# Patient Record
Sex: FEMALE | Race: WHITE | NOT HISPANIC OR LATINO
[De-identification: names, ages, dates, MRNs, and addresses within clinical notes are randomized per-mention and may not be internally consistent; named-entity substitution may affect disease eponyms.]

---

## 2017-08-18 PROBLEM — Z00.00 ENCOUNTER FOR PREVENTIVE HEALTH EXAMINATION: Status: ACTIVE | Noted: 2017-08-18

## 2020-10-13 ENCOUNTER — RESULT REVIEW (OUTPATIENT)
Age: 40
End: 2020-10-13

## 2022-03-08 ENCOUNTER — RESULT REVIEW (OUTPATIENT)
Age: 42
End: 2022-03-08

## 2022-11-28 ENCOUNTER — RESULT REVIEW (OUTPATIENT)
Age: 42
End: 2022-11-28

## 2023-12-22 ENCOUNTER — APPOINTMENT (OUTPATIENT)
Dept: INTERVENTIONAL RADIOLOGY/VASCULAR | Facility: HOSPITAL | Age: 43
End: 2023-12-22

## 2024-01-12 ENCOUNTER — APPOINTMENT (OUTPATIENT)
Dept: MRI IMAGING | Facility: HOSPITAL | Age: 44
End: 2024-01-12

## 2024-01-12 ENCOUNTER — RESULT REVIEW (OUTPATIENT)
Age: 44
End: 2024-01-12

## 2024-01-12 ENCOUNTER — OUTPATIENT (OUTPATIENT)
Dept: OUTPATIENT SERVICES | Facility: HOSPITAL | Age: 44
LOS: 1 days | End: 2024-01-12
Payer: COMMERCIAL

## 2024-01-12 PROCEDURE — 72197 MRI PELVIS W/O & W/DYE: CPT

## 2024-01-12 PROCEDURE — 72197 MRI PELVIS W/O & W/DYE: CPT | Mod: 26

## 2024-01-12 PROCEDURE — A9585: CPT

## 2024-01-25 ENCOUNTER — TRANSCRIPTION ENCOUNTER (OUTPATIENT)
Age: 44
End: 2024-01-25

## 2024-02-13 ENCOUNTER — RESULT REVIEW (OUTPATIENT)
Age: 44
End: 2024-02-13

## 2024-02-13 ENCOUNTER — OUTPATIENT (OUTPATIENT)
Dept: OUTPATIENT SERVICES | Facility: HOSPITAL | Age: 44
LOS: 1 days | End: 2024-02-13
Payer: COMMERCIAL

## 2024-02-13 ENCOUNTER — APPOINTMENT (OUTPATIENT)
Dept: INTERVENTIONAL RADIOLOGY/VASCULAR | Facility: HOSPITAL | Age: 44
End: 2024-02-13
Payer: COMMERCIAL

## 2024-02-13 VITALS
RESPIRATION RATE: 18 BRPM | TEMPERATURE: 98 F | SYSTOLIC BLOOD PRESSURE: 119 MMHG | HEIGHT: 67 IN | OXYGEN SATURATION: 100 % | HEART RATE: 84 BPM | DIASTOLIC BLOOD PRESSURE: 76 MMHG | WEIGHT: 136.91 LBS

## 2024-02-13 PROCEDURE — C1894: CPT

## 2024-02-13 PROCEDURE — 36247 INS CATH ABD/L-EXT ART 3RD: CPT | Mod: 59

## 2024-02-13 PROCEDURE — 36247 INS CATH ABD/L-EXT ART 3RD: CPT

## 2024-02-13 PROCEDURE — C1769: CPT

## 2024-02-13 PROCEDURE — C1889: CPT

## 2024-02-13 PROCEDURE — 37243 VASC EMBOLIZE/OCCLUDE ORGAN: CPT

## 2024-02-13 PROCEDURE — C1887: CPT

## 2024-02-13 RX ORDER — ONDANSETRON 8 MG/1
1 TABLET, FILM COATED ORAL
Qty: 9 | Refills: 0
Start: 2024-02-13 | End: 2024-02-15

## 2024-02-13 RX ORDER — OXYCODONE HYDROCHLORIDE 5 MG/1
1 TABLET ORAL
Qty: 20 | Refills: 0
Start: 2024-02-13 | End: 2024-02-17

## 2024-02-13 RX ORDER — IBUPROFEN 200 MG
1 TABLET ORAL
Qty: 40 | Refills: 0
Start: 2024-02-13 | End: 2024-02-22

## 2024-03-05 ENCOUNTER — APPOINTMENT (OUTPATIENT)
Dept: INTERVENTIONAL RADIOLOGY/VASCULAR | Facility: HOSPITAL | Age: 44
End: 2024-03-05

## 2024-06-04 DIAGNOSIS — D25.0 SUBMUCOUS LEIOMYOMA OF UTERUS: ICD-10-CM

## 2024-07-17 ENCOUNTER — APPOINTMENT (OUTPATIENT)
Dept: INTERVENTIONAL RADIOLOGY/VASCULAR | Facility: HOSPITAL | Age: 44
End: 2024-07-17

## 2024-10-04 ENCOUNTER — APPOINTMENT (OUTPATIENT)
Dept: MRI IMAGING | Facility: HOSPITAL | Age: 44
End: 2024-10-04

## 2024-10-04 ENCOUNTER — OUTPATIENT (OUTPATIENT)
Dept: OUTPATIENT SERVICES | Facility: HOSPITAL | Age: 44
LOS: 1 days | End: 2024-10-04
Payer: COMMERCIAL

## 2024-10-04 PROCEDURE — A9585: CPT

## 2024-10-04 PROCEDURE — 72197 MRI PELVIS W/O & W/DYE: CPT

## 2024-10-04 PROCEDURE — 72197 MRI PELVIS W/O & W/DYE: CPT | Mod: 26

## 2024-10-18 ENCOUNTER — APPOINTMENT (OUTPATIENT)
Dept: INTERVENTIONAL RADIOLOGY/VASCULAR | Facility: HOSPITAL | Age: 44
End: 2024-10-18

## 2025-06-13 ENCOUNTER — OUTPATIENT (OUTPATIENT)
Dept: OUTPATIENT SERVICES | Facility: HOSPITAL | Age: 45
LOS: 1 days | End: 2025-06-13

## 2025-06-13 DIAGNOSIS — Z01.818 ENCOUNTER FOR OTHER PREPROCEDURAL EXAMINATION: ICD-10-CM

## 2025-06-13 PROCEDURE — 86901 BLOOD TYPING SEROLOGIC RH(D): CPT

## 2025-06-13 PROCEDURE — 86850 RBC ANTIBODY SCREEN: CPT

## 2025-06-13 PROCEDURE — 86900 BLOOD TYPING SEROLOGIC ABO: CPT

## 2025-07-17 VITALS
TEMPERATURE: 99 F | RESPIRATION RATE: 17 BRPM | HEART RATE: 77 BPM | DIASTOLIC BLOOD PRESSURE: 83 MMHG | SYSTOLIC BLOOD PRESSURE: 121 MMHG | OXYGEN SATURATION: 99 % | HEIGHT: 67 IN | WEIGHT: 141.1 LBS

## 2025-07-17 RX ORDER — TRANEXAMIC ACID 1000 MG/10
2 AMPUL (ML) INTRAVENOUS
Refills: 0 | DISCHARGE

## 2025-07-17 NOTE — ASU PATIENT PROFILE, ADULT - NSICDXPASTMEDICALHX_GEN_ALL_CORE_FT
PAST MEDICAL HISTORY:  Graves' disease     AURELIO (iron deficiency anemia)     Uterine fibroid

## 2025-07-17 NOTE — ASU PATIENT PROFILE, ADULT - NSICDXPASTSURGICALHX_GEN_ALL_CORE_FT
PAST SURGICAL HISTORY:  Elective surgery uterine fibroid embolization    H/O myomectomy     H/O:

## 2025-07-18 ENCOUNTER — TRANSCRIPTION ENCOUNTER (OUTPATIENT)
Age: 45
End: 2025-07-18

## 2025-07-18 ENCOUNTER — OUTPATIENT (OUTPATIENT)
Dept: OUTPATIENT SERVICES | Facility: HOSPITAL | Age: 45
LOS: 1 days | End: 2025-07-18
Payer: COMMERCIAL

## 2025-07-18 VITALS
HEART RATE: 78 BPM | OXYGEN SATURATION: 98 % | DIASTOLIC BLOOD PRESSURE: 78 MMHG | SYSTOLIC BLOOD PRESSURE: 104 MMHG | TEMPERATURE: 97 F

## 2025-07-18 DIAGNOSIS — Z98.891 HISTORY OF UTERINE SCAR FROM PREVIOUS SURGERY: Chronic | ICD-10-CM

## 2025-07-18 DIAGNOSIS — Z41.9 ENCOUNTER FOR PROCEDURE FOR PURPOSES OTHER THAN REMEDYING HEALTH STATE, UNSPECIFIED: Chronic | ICD-10-CM

## 2025-07-18 DIAGNOSIS — Z98.890 OTHER SPECIFIED POSTPROCEDURAL STATES: Chronic | ICD-10-CM

## 2025-07-18 PROCEDURE — 58544 LSH W/T/O UTERUS ABOVE 250 G: CPT

## 2025-07-18 PROCEDURE — 86900 BLOOD TYPING SEROLOGIC ABO: CPT

## 2025-07-18 PROCEDURE — 86850 RBC ANTIBODY SCREEN: CPT

## 2025-07-18 PROCEDURE — 88307 TISSUE EXAM BY PATHOLOGIST: CPT

## 2025-07-18 PROCEDURE — 88307 TISSUE EXAM BY PATHOLOGIST: CPT | Mod: 26

## 2025-07-18 PROCEDURE — 86901 BLOOD TYPING SEROLOGIC RH(D): CPT

## 2025-07-18 PROCEDURE — C1889: CPT

## 2025-07-18 PROCEDURE — 49329 UNLSTD LAPS PX ABD PERTM&OMN: CPT

## 2025-07-18 PROCEDURE — 88304 TISSUE EXAM BY PATHOLOGIST: CPT

## 2025-07-18 PROCEDURE — 82962 GLUCOSE BLOOD TEST: CPT

## 2025-07-18 PROCEDURE — 88304 TISSUE EXAM BY PATHOLOGIST: CPT | Mod: 26

## 2025-07-18 DEVICE — SURGIFLO MATRIX WITH THROMBIN KIT
Type: IMPLANTABLE DEVICE | Status: NON-FUNCTIONAL
Removed: 2025-07-18

## 2025-07-18 DEVICE — SURGIFLO HEMOSTATIC MATRIX KIT
Type: IMPLANTABLE DEVICE | Status: NON-FUNCTIONAL
Removed: 2025-07-18

## 2025-07-18 RX ORDER — METOCLOPRAMIDE HCL 10 MG
10 TABLET ORAL EVERY 6 HOURS
Refills: 0 | Status: DISCONTINUED | OUTPATIENT
Start: 2025-07-18 | End: 2025-07-18

## 2025-07-18 RX ORDER — KETOROLAC TROMETHAMINE 30 MG/ML
30 INJECTION, SOLUTION INTRAMUSCULAR; INTRAVENOUS EVERY 6 HOURS
Refills: 0 | Status: DISCONTINUED | OUTPATIENT
Start: 2025-07-18 | End: 2025-07-18

## 2025-07-18 RX ORDER — GABAPENTIN 400 MG/1
300 CAPSULE ORAL ONCE
Refills: 0 | Status: DISCONTINUED | OUTPATIENT
Start: 2025-07-18 | End: 2025-07-18

## 2025-07-18 RX ORDER — SODIUM CHLORIDE 9 G/1000ML
1000 INJECTION, SOLUTION INTRAVENOUS
Refills: 0 | Status: DISCONTINUED | OUTPATIENT
Start: 2025-07-18 | End: 2025-07-18

## 2025-07-18 RX ORDER — ONDANSETRON HCL/PF 4 MG/2 ML
8 VIAL (ML) INJECTION EVERY 6 HOURS
Refills: 0 | Status: DISCONTINUED | OUTPATIENT
Start: 2025-07-18 | End: 2025-07-18

## 2025-07-18 RX ORDER — APREPITANT 40 MG/1
40 CAPSULE ORAL ONCE
Refills: 0 | Status: COMPLETED | OUTPATIENT
Start: 2025-07-18 | End: 2025-07-18

## 2025-07-18 RX ORDER — PHENAZOPYRIDINE HCL 100 MG
200 TABLET ORAL ONCE
Refills: 0 | Status: DISCONTINUED | OUTPATIENT
Start: 2025-07-18 | End: 2025-07-18

## 2025-07-18 RX ORDER — OXYCODONE HYDROCHLORIDE 30 MG/1
5 TABLET ORAL EVERY 4 HOURS
Refills: 0 | Status: DISCONTINUED | OUTPATIENT
Start: 2025-07-18 | End: 2025-07-18

## 2025-07-18 RX ORDER — TRAMADOL HYDROCHLORIDE 50 MG/1
50 TABLET, FILM COATED ORAL ONCE
Refills: 0 | Status: DISCONTINUED | OUTPATIENT
Start: 2025-07-18 | End: 2025-07-18

## 2025-07-18 RX ORDER — BUPIVACAINE 13.3 MG/ML
20 INJECTION, SUSPENSION, LIPOSOMAL INFILTRATION ONCE
Refills: 0 | Status: DISCONTINUED | OUTPATIENT
Start: 2025-07-18 | End: 2025-07-18

## 2025-07-18 RX ORDER — ACETAMINOPHEN 500 MG/5ML
1000 LIQUID (ML) ORAL EVERY 6 HOURS
Refills: 0 | Status: DISCONTINUED | OUTPATIENT
Start: 2025-07-18 | End: 2025-07-18

## 2025-07-18 RX ORDER — OXYCODONE HYDROCHLORIDE 30 MG/1
10 TABLET ORAL EVERY 4 HOURS
Refills: 0 | Status: DISCONTINUED | OUTPATIENT
Start: 2025-07-18 | End: 2025-07-18

## 2025-07-18 RX ORDER — SCOPOLAMINE 1 MG/3D
1 PATCH, EXTENDED RELEASE TRANSDERMAL
Refills: 0 | Status: DISCONTINUED | OUTPATIENT
Start: 2025-07-18 | End: 2025-07-18

## 2025-07-18 RX ORDER — HYDROMORPHONE/SOD CHLOR,ISO/PF 2 MG/10 ML
0.5 SYRINGE (ML) INJECTION
Refills: 0 | Status: DISCONTINUED | OUTPATIENT
Start: 2025-07-18 | End: 2025-07-18

## 2025-07-18 RX ORDER — ACETAMINOPHEN 500 MG/5ML
2 LIQUID (ML) ORAL
Qty: 0 | Refills: 0 | DISCHARGE
Start: 2025-07-18

## 2025-07-18 RX ORDER — PHENAZOPYRIDINE HCL 100 MG
200 TABLET ORAL ONCE
Refills: 0 | Status: COMPLETED | OUTPATIENT
Start: 2025-07-18 | End: 2025-07-18

## 2025-07-18 RX ORDER — SIMETHICONE 80 MG
80 TABLET,CHEWABLE ORAL EVERY 6 HOURS
Refills: 0 | Status: DISCONTINUED | OUTPATIENT
Start: 2025-07-18 | End: 2025-07-18

## 2025-07-18 RX ADMIN — Medication 200 MILLIGRAM(S): at 11:36

## 2025-07-18 RX ADMIN — APREPITANT 40 MILLIGRAM(S): 40 CAPSULE ORAL at 11:36

## 2025-07-18 RX ADMIN — Medication 8 MILLIGRAM(S): at 18:54

## 2025-07-18 RX ADMIN — SCOPOLAMINE 1 PATCH: 1 PATCH, EXTENDED RELEASE TRANSDERMAL at 12:36

## 2025-07-18 NOTE — BRIEF OPERATIVE NOTE - OPERATION/FINDINGS
EBL 10 IVF 1700   Cefotetan and flagyl pre op  Abdomen entered in open fashion at umbilicus, mini lap  4 additional 5mm ports placed, 2x L lateral 2x R lateral  dense adhesions between bladder/uterus taken down with harmonic  l/s JADE, BS performed  specimen removed via morcellation via umbilicus  normal ovaries visualized  cystoscopy, b/l ureteral jets visualized  fascia of umbilical incision closed with vicryl, skin of all incisions closed w/ monocryl   EBL 10 IVF 1700   Cefotetan and flagyl pre op  Abdomen entered in open fashion above umbilicus, mini lap  4 additional 5mm ports placed, 2x L lateral 2x R lateral  dense adhesions between bladder/uterus taken down with harmonic  l/s JADE, BS performed  specimen removed via morcellation via umbilicus  normal ovaries visualized  cystoscopy, b/l ureteral jets visualized  fascia of umbilical incision closed with vicryl, skin of all incisions closed w/ monocryl

## 2025-07-18 NOTE — BRIEF OPERATIVE NOTE - NSICDXBRIEFPROCEDURE_GEN_ALL_CORE_FT
PROCEDURES:  Supracervical hysterectomy 18-Jul-2025 12:41:56  Tayo Gaytan  
PROCEDURES:  Supracervical hysterectomy 18-Jul-2025 12:41:56  Tayo Gaytan

## 2025-07-18 NOTE — ASU DISCHARGE PLAN (ADULT/PEDIATRIC) - CARE PROVIDER_API CALL
Chastity Guidry)  Obstetrics & Gynecology  [address], [address]  [place] [address]  Phone: [phone]  Fax: [phone]  Follow Up Time:

## 2025-07-18 NOTE — ASU DISCHARGE PLAN (ADULT/PEDIATRIC) - NS MD DC FALL RISK RISK
For information on Fall & Injury Prevention, visit: [email or web address] OR  [email or web address] OR  https://www.cdc.gov/steadi/patient.html

## 2025-07-18 NOTE — DISCHARGE NOTE NURSING/CASE MANAGEMENT/SOCIAL WORK - FINANCIAL ASSISTANCE
[facility] provides services at a reduced cost to those who are determined to be eligible through [facility]’s financial assistance program. Information regarding [facility]’s financial assistance program can be found by going to [email or web address] or by calling [phone].

## 2025-07-18 NOTE — DISCHARGE NOTE NURSING/CASE MANAGEMENT/SOCIAL WORK - NSDCPEFALRISK_GEN_ALL_CORE
For information on Fall & Injury Prevention, visit: [email or web address] OR  [email or web address] OR  https://www.cdc.gov/steadi/patient.html

## 2025-07-18 NOTE — BRIEF OPERATIVE NOTE - NSICDXBRIEFPREOP_GEN_ALL_CORE_FT
PRE-OP DIAGNOSIS:  Fibroid uterus 18-Jul-2025 12:42:03  Tayo Gaytan  
PRE-OP DIAGNOSIS:  Fibroid uterus 18-Jul-2025 12:42:03  Tayo Gaytan

## 2025-07-18 NOTE — PROGRESS NOTE ADULT - SUBJECTIVE AND OBJECTIVE BOX
GYN POC    Pt seen and examined at bedside. Pain well controlled, no N/V. Ambulated and feels well.     T(F): 97 (07-18-25 @ 20:50), Max: 97 (07-18-25 @ 18:18)  HR: 78 (07-18-25 @ 20:50) (77 - 93)  BP: 104/78 (07-18-25 @ 20:50) (104/78 - 132/74)  RR: 16 (07-18-25 @ 19:33) (10 - 19)  SpO2: 98% (07-18-25 @ 20:50) (98% - 100%)  Wt(kg): --    07-18 @ 07:01  -  07-19 @ 02:01  --------------------------------------------------------  IN: 485 mL / OUT: 50 mL / NET: 435 mL        acetaminophen     Tablet .. 1000 milliGRAM(s) Oral every 6 hours  gabapentin 300 milliGRAM(s) Oral once PRN moderate pain  HYDROmorphone  Injectable 0.5 milliGRAM(s) IV Push every 15 minutes PRN Severe Pain (7 - 10)  ketorolac   Injectable 30 milliGRAM(s) IV Push every 6 hours  lactated ringers. 1000 milliLiter(s) IV Continuous <Continuous>  metoclopramide Injectable 10 milliGRAM(s) IV Push every 6 hours PRN Nausea and/or Vomiting  ondansetron Injectable 8 milliGRAM(s) IV Push every 6 hours PRN Nausea and/or Vomiting  oxyCODONE    IR 5 milliGRAM(s) Oral every 4 hours PRN Moderate Pain (4 - 6)  oxyCODONE    IR 10 milliGRAM(s) Oral every 4 hours PRN Severe Pain (7 - 10)  pantoprazole  Injectable 20 milliGRAM(s) IV Push daily  phenazopyridine 200 milliGRAM(s) Oral once PRN bladder spasm  simethicone 80 milliGRAM(s) Chew every 6 hours  traMADol 50 milliGRAM(s) Oral once PRN Severe Pain (7 - 10)      Physical exam:  Constitutional: NAD  Pulmonary: no increased WOB  Abdomen: incision site clean, dry and intact. Soft, mildly tender, mildly distended appropriate for post op state   Extremities: no lower extremity edema, or calf tenderness. SCDs in place      A/P    - Cleared for discharge  - F/u w/ GYN in approximately 2 weeks    Leeanna Watkins PGY2

## 2025-07-18 NOTE — BRIEF OPERATIVE NOTE - SPECIMENS
uterus, b/l fallopian tubes
uterus, b/l fallopian tubes
3 - Moderate disability. Requires some help, but able to walk unassisted.

## 2025-07-18 NOTE — PRE-ANESTHESIA EVALUATION ADULT - BMI (KG/M2)
Bedside shift change report given to [address] [place] [address] (oncoming nurse) by Antonino Torres (offgoing nurse). Report included the following information SBAR and MAR. 22.1

## 2025-07-18 NOTE — ASU DISCHARGE PLAN (ADULT/PEDIATRIC) - FINANCIAL ASSISTANCE
[facility] provides services at a reduced cost to those who are determined to be eligible through [facility]’s financial assistance program. Information regarding [facility]’s financial assistance program can be found by going to [email or web address] or by calling [phone].

## 2025-07-18 NOTE — DISCHARGE NOTE NURSING/CASE MANAGEMENT/SOCIAL WORK - PATIENT PORTAL LINK FT
You can access the FollowMyHealth Patient Portal offered by [facility] by registering at the following website: [email or web address]. By joining [employer]’s FollowMyHealth portal, you will also be able to view your health information using other applications (apps) compatible with our system.

## (undated) DEVICE — TIP METZENBAUM SCISSOR MONOPOLAR ENDOCUT (ORANGE)

## (undated) DEVICE — Device

## (undated) DEVICE — DISSECTOR ENDOSCOPIC KITTNER SINGLE TIP

## (undated) DEVICE — RUMI TIP GREEN 6.7MM X 10CM

## (undated) DEVICE — BLADE SURGICAL #10 CARBON

## (undated) DEVICE — LIGASURE BLUNT TIP 5MM X 37CM

## (undated) DEVICE — SUT VLOC 180 0 12" GS-21 GREEN

## (undated) DEVICE — TROCAR APPLIED MEDICAL KII FIOS FIRST ENTRY 12MM X 100MM ADVANCED FIXATION

## (undated) DEVICE — INSUFFLATION NDL COVIDIEN SURGINEEDLE VERESS 120MM

## (undated) DEVICE — DRSG DERMABOND 0.7ML

## (undated) DEVICE — WARMING BLANKET UPPER ADULT

## (undated) DEVICE — APPLICATOR ENDOSCOPIC FOR SUGIFLO

## (undated) DEVICE — SUT VICRYL 0 27" UR-6

## (undated) DEVICE — PACK GYN WDC

## (undated) DEVICE — FOLEY TRAY 16FR 5CC LF UMETER CLOSED

## (undated) DEVICE — RUMI KOH-EFFICIENT STAINLESS STEEL 3.0CM

## (undated) DEVICE — TUBING STRYKER PNEUMOCLEAR SMOKE EVACUATION HIGH FLOW

## (undated) DEVICE — SUT MONOCRYL 4-0 27" PS-2 UNDYED

## (undated) DEVICE — TROCAR APPLIED MEDICAL KII FIOS FIRST ENTRY 5MM X 100MM ADVANCED FIXATION

## (undated) DEVICE — RUMI KOH-EFFICIENT STAINLESS STEEL 4.0CM

## (undated) DEVICE — DRAPE MAYO STAND 30"

## (undated) DEVICE — NDL GRASPING DISP

## (undated) DEVICE — D HELP - CLEARVIEW CLEARIFY SYSTEM

## (undated) DEVICE — RUMI KOH-EFFICIENT STAINLESS STEEL 3.5CM

## (undated) DEVICE — VENODYNE/SCD SLEEVE CALF MEDIUM

## (undated) DEVICE — POSITIONER FOAM EGG CRATE ULNAR 2PCS (PINK)

## (undated) DEVICE — SHEARS HARMONIC 1100 5MM X 36CM CURVED TIP

## (undated) DEVICE — DRAPE TOP SHEET 53" X 101"

## (undated) DEVICE — STAPLER SKIN PROXIMATE

## (undated) DEVICE — SOL IRR BAG NS 0.9% 3000ML

## (undated) DEVICE — NDL HYPO SAFE 22G X 1.5" (BLACK)

## (undated) DEVICE — DRAPE IOBAN 23" X 23"